# Patient Record
Sex: FEMALE | URBAN - METROPOLITAN AREA
[De-identification: names, ages, dates, MRNs, and addresses within clinical notes are randomized per-mention and may not be internally consistent; named-entity substitution may affect disease eponyms.]

---
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
## 2024-07-19 ENCOUNTER — OFFICE VISIT (OUTPATIENT)
Dept: URBAN - METROPOLITAN AREA CLINIC 98 | Facility: CLINIC | Age: 19
End: 2024-07-19

## 2024-07-19 VITALS
DIASTOLIC BLOOD PRESSURE: 104 MMHG | BODY MASS INDEX: 21.33 KG/M2 | TEMPERATURE: 98.4 F | HEIGHT: 69 IN | WEIGHT: 144 LBS | SYSTOLIC BLOOD PRESSURE: 144 MMHG | HEART RATE: 69 BPM

## 2024-07-19 PROBLEM — 10743008: Status: ACTIVE | Noted: 2024-07-19

## 2024-07-19 RX ORDER — CEFDINIR 300 MG/1
AS DIRECTED CAPSULE ORAL
Status: ON HOLD | COMMUNITY

## 2024-07-19 RX ORDER — RIFAXIMIN 550 MG/1
1 TABLET TABLET ORAL THREE TIMES A DAY
Qty: 42 TABLET | Refills: 0 | OUTPATIENT
Start: 2024-07-19 | End: 2024-08-02

## 2024-07-19 RX ORDER — HYOSCYAMINE SULFATE 0.12 MG/1
1 TABLET AS NEEDED TABLET ORAL
Qty: 60 | Refills: 0 | OUTPATIENT
Start: 2024-07-19 | End: 2024-08-18

## 2024-07-19 RX ORDER — NORETHINDRONE AND ETHINYL ESTRADIOL 0.4-0.035
1 TABLET KIT ORAL
Status: ACTIVE | COMMUNITY

## 2024-07-19 RX ORDER — FLUTICASONE FUROATE AND VILANTEROL TRIFENATATE 100; 25 UG/1; UG/1
1 PUFF POWDER RESPIRATORY (INHALATION)
Status: ON HOLD | COMMUNITY

## 2024-07-19 NOTE — HPI-TODAY'S VISIT:
12/4/2023- Dr. Mathew ran out of Pathful BM  cannot figure out triggers  has reactive hypoglycemia  low fodmap didn't work sx correlate with the low bg  7/19/2024- Danisha Staples NP - Here with complaints of increased bloating, stabbing abdominal pain - Started in May and became worse in June - Last year dx with reactive hypoglycemia - PMH: food sensitivities, IBS - BM 1 time per day - Stools range from loose to hard - Sensitive to greasy foods, fruits, vegetables, some nuts - Denies bright red blood, melena or mucus - Bloating 15-30 minutes after meal; takes 3-4 hours to subside - Nausea with bloating - Student at [employer] - No vomiting, heartburn, dysphagia

## 2024-07-31 ENCOUNTER — WEB ENCOUNTER (OUTPATIENT)
Dept: URBAN - METROPOLITAN AREA CLINIC 98 | Facility: CLINIC | Age: 19
End: 2024-07-31

## 2024-08-06 ENCOUNTER — WEB ENCOUNTER (OUTPATIENT)
Dept: URBAN - METROPOLITAN AREA CLINIC 98 | Facility: CLINIC | Age: 19
End: 2024-08-06

## 2024-08-26 ENCOUNTER — ERX REFILL RESPONSE (OUTPATIENT)
Dept: URBAN - METROPOLITAN AREA CLINIC 98 | Facility: CLINIC | Age: 19
End: 2024-08-26

## 2024-08-26 RX ORDER — HYOSCYAMINE SULFATE 0.38 MG/1
1 TABLET TABLET, EXTENDED RELEASE ORAL
Qty: 60 TABLET | Refills: 3 | OUTPATIENT

## 2024-10-24 ENCOUNTER — OFFICE VISIT (OUTPATIENT)
Dept: URBAN - METROPOLITAN AREA CLINIC 98 | Facility: CLINIC | Age: 19
End: 2024-10-24

## 2024-10-24 VITALS
SYSTOLIC BLOOD PRESSURE: 113 MMHG | TEMPERATURE: 98 F | HEART RATE: 77 BPM | WEIGHT: 141 LBS | BODY MASS INDEX: 20.88 KG/M2 | HEIGHT: 69 IN | DIASTOLIC BLOOD PRESSURE: 74 MMHG

## 2024-10-24 RX ORDER — HYOSCYAMINE SULFATE 0.38 MG/1
1 TABLET TABLET, EXTENDED RELEASE ORAL
Qty: 60 TABLET | Refills: 3
End: 2025-02-21

## 2024-10-24 RX ORDER — RIFAXIMIN 550 MG/1
1 TABLET TABLET ORAL THREE TIMES A DAY
Qty: 42 TABLET | Refills: 0 | OUTPATIENT
Start: 2024-10-24 | End: 2024-11-06

## 2024-10-24 RX ORDER — FLUTICASONE FUROATE AND VILANTEROL TRIFENATATE 100; 25 UG/1; UG/1
1 PUFF POWDER RESPIRATORY (INHALATION)
Status: ON HOLD | COMMUNITY

## 2024-10-24 RX ORDER — HYOSCYAMINE SULFATE 0.38 MG/1
1 TABLET TABLET, EXTENDED RELEASE ORAL
Qty: 60 TABLET | Refills: 3 | Status: ACTIVE | COMMUNITY

## 2024-10-24 RX ORDER — CEFDINIR 300 MG/1
AS DIRECTED CAPSULE ORAL
Status: ON HOLD | COMMUNITY

## 2024-10-24 RX ORDER — NORETHINDRONE AND ETHINYL ESTRADIOL 0.4-0.035
1 TABLET KIT ORAL
Status: ACTIVE | COMMUNITY

## 2024-10-24 NOTE — HPI-TODAY'S VISIT:
gut is "a mess"  a lot of cramps and pain  changed OCP but worsened her mood  having a lot of bloating  cut out gluten but didn't help and diarrhea and bloating  anything she eats  bloating   xifaxan helped while she was on, then sx recurred  hyoscyamine helping

## 2024-11-09 ENCOUNTER — TELEPHONE ENCOUNTER (OUTPATIENT)
Dept: URBAN - METROPOLITAN AREA CLINIC 98 | Facility: CLINIC | Age: 19
End: 2024-11-09

## 2025-06-26 ENCOUNTER — OFFICE VISIT (OUTPATIENT)
Dept: URBAN - METROPOLITAN AREA CLINIC 98 | Facility: CLINIC | Age: 20
End: 2025-06-26
Payer: COMMERCIAL

## 2025-06-26 DIAGNOSIS — R11.0 NAUSEA: ICD-10-CM

## 2025-06-26 DIAGNOSIS — K58.0 IRRITABLE BOWEL SYNDROME WITH DIARRHEA: ICD-10-CM

## 2025-06-26 DIAGNOSIS — R19.5 DARK STOOLS: ICD-10-CM

## 2025-06-26 DIAGNOSIS — R14.0 BLOATING: ICD-10-CM

## 2025-06-26 PROCEDURE — 99213 OFFICE O/P EST LOW 20 MIN: CPT | Performed by: INTERNAL MEDICINE

## 2025-06-26 RX ORDER — NORETHINDRONE AND ETHINYL ESTRADIOL 0.4-0.035
1 TABLET KIT ORAL
Status: ACTIVE | COMMUNITY

## 2025-06-26 RX ORDER — PANTOPRAZOLE SODIUM 40 MG/1
1 TABLET TABLET, DELAYED RELEASE ORAL ONCE A DAY
Qty: 30 | OUTPATIENT
Start: 2025-06-26

## 2025-06-26 RX ORDER — FLUTICASONE FUROATE AND VILANTEROL TRIFENATATE 100; 25 UG/1; UG/1
1 PUFF POWDER RESPIRATORY (INHALATION)
Status: ON HOLD | COMMUNITY

## 2025-06-26 RX ORDER — HYOSCYAMINE SULFATE 0.38 MG/1
1 TABLET TABLET, EXTENDED RELEASE ORAL
Qty: 60 | Status: ACTIVE | COMMUNITY

## 2025-06-26 RX ORDER — DICLOFENAC SODIUM 75 MG/1
1 TABLET AS NEEDED TABLET, DELAYED RELEASE ORAL TWICE A DAY
Status: ACTIVE | COMMUNITY

## 2025-06-26 RX ORDER — CEFDINIR 300 MG/1
AS DIRECTED CAPSULE ORAL
Status: ON HOLD | COMMUNITY

## 2025-06-26 NOTE — HPI-TODAY'S VISIT:
Bloating has reduced  April : needed abx for staph infection (cellulitis)  found bad reaction to sulfa started on diclofenac for knee : 14 day course  got black stools for 3 days about 5 day ago : brings in a picture - and stools are normal now  mild abdominal discomfort

## 2025-08-16 ENCOUNTER — ERX REFILL RESPONSE (OUTPATIENT)
Dept: URBAN - METROPOLITAN AREA CLINIC 98 | Facility: CLINIC | Age: 20
End: 2025-08-16

## 2025-08-16 RX ORDER — HYOSCYAMINE SULFATE 0.38 MG/1
TAKE 1 TABLET BY MOUTH EVERY 12 HOURS FOR 30 DAYS TABLET, EXTENDED RELEASE ORAL
Qty: 180 TABLET | Refills: 1 | OUTPATIENT